# Patient Record
Sex: MALE | Race: WHITE | URBAN - METROPOLITAN AREA
[De-identification: names, ages, dates, MRNs, and addresses within clinical notes are randomized per-mention and may not be internally consistent; named-entity substitution may affect disease eponyms.]

---

## 2018-10-18 ENCOUNTER — APPOINTMENT (RX ONLY)
Dept: URBAN - METROPOLITAN AREA CLINIC 161 | Facility: CLINIC | Age: 58
Setting detail: DERMATOLOGY
End: 2018-10-18

## 2018-10-18 DIAGNOSIS — L72.8 OTHER FOLLICULAR CYSTS OF THE SKIN AND SUBCUTANEOUS TISSUE: ICD-10-CM

## 2018-10-18 PROCEDURE — ? PATIENT SPECIFIC COUNSELING

## 2018-10-18 PROCEDURE — ? EXCISION (ELECTIVE) NO PATHOLOGY

## 2018-10-18 ASSESSMENT — LOCATION ZONE DERM: LOCATION ZONE: AXILLAE

## 2018-10-18 ASSESSMENT — LOCATION SIMPLE DESCRIPTION DERM: LOCATION SIMPLE: LEFT AXILLARY VAULT

## 2018-10-18 ASSESSMENT — LOCATION DETAILED DESCRIPTION DERM: LOCATION DETAILED: LEFT AXILLARY VAULT

## 2018-10-18 NOTE — PROCEDURE: EXCISION (ELECTIVE) NO PATHOLOGY
Hemostasis: Electrocautery
X Size Of Lesion In Cm (Optional): 0
Deep Sutures: 5-0 Vicryl
Repair Type: None (Simple)
Excision Method: Round
Excision Depth: adipose tissue
Number Of Epidermal Sutures (Optional): 4
Dressing: Band-Aid
Detail Level: Detailed
Medical Necessity Clause: This procedure was medically necessary because the lesion that was treated was:
Suture Removal: 14 days
Additional Anesthesia Volume In Cc: 6
Price (Use Numbers Only, No Special Characters Or $): 150
Size Of Lesion In Cm: 2
Post-Care Instructions: I reviewed with the patient in detail post-care instructions. Patient is not to engage in any heavy lifting, exercise, or swimming for the next 14 days. Should the patient develop any fevers, chills, bleeding, severe pain patient will contact the office immediately.
Anesthesia Type: 1% lidocaine with epinephrine
Consent was obtained from the patient. The risks and benefits to therapy were discussed in detail. Specifically, the risks of infection, scarring, bleeding, prolonged wound healing, incomplete removal, allergy to anesthesia, nerve injury and recurrence were addressed. Prior to the procedure, the treatment site was clearly identified and confirmed by the patient. All components of Universal Protocol/PAUSE Rule completed.
Bill For Surgical Tray: no
Path Notes (To The Dermatopathologist): Please check margins.
Epidermal Sutures: 4-0 Ethilon
Intermediate / Complex Repair - Final Wound Length In Cm: 1.5
Scalpel Size: 15 blade
Wound Care: Petrolatum
Epidermal Closure: simple interrupted
Estimated Blood Loss (Cc): minimal

## 2018-10-18 NOTE — PROCEDURE: PATIENT SPECIFIC COUNSELING
Patient declines submission of specimen to lab as he has no medical insurance.
Detail Level: Detailed

## 2018-10-29 ENCOUNTER — APPOINTMENT (RX ONLY)
Dept: URBAN - METROPOLITAN AREA CLINIC 161 | Facility: CLINIC | Age: 58
Setting detail: DERMATOLOGY
End: 2018-10-29

## 2018-10-29 DIAGNOSIS — Z48.02 ENCOUNTER FOR REMOVAL OF SUTURES: ICD-10-CM

## 2018-10-29 PROBLEM — L70.0 ACNE VULGARIS: Status: ACTIVE | Noted: 2018-10-29

## 2018-10-29 PROBLEM — L23.7 ALLERGIC CONTACT DERMATITIS DUE TO PLANTS, EXCEPT FOOD: Status: ACTIVE | Noted: 2018-10-29

## 2018-10-29 PROCEDURE — ? SUTURE REMOVAL (GLOBAL PERIOD)

## 2018-10-29 ASSESSMENT — LOCATION DETAILED DESCRIPTION DERM: LOCATION DETAILED: LEFT AXILLARY VAULT

## 2018-10-29 ASSESSMENT — LOCATION SIMPLE DESCRIPTION DERM: LOCATION SIMPLE: LEFT AXILLARY VAULT

## 2018-10-29 ASSESSMENT — LOCATION ZONE DERM: LOCATION ZONE: AXILLAE

## 2018-10-29 NOTE — PROCEDURE: SUTURE REMOVAL (GLOBAL PERIOD)
Add 80741 Cpt? (Important Note: In 2017 The Use Of 26758 Is Being Tracked By Cms To Determine Future Global Period Reimbursement For Global Periods): no
Detail Level: Detailed